# Patient Record
Sex: MALE | Race: WHITE | NOT HISPANIC OR LATINO | URBAN - METROPOLITAN AREA
[De-identification: names, ages, dates, MRNs, and addresses within clinical notes are randomized per-mention and may not be internally consistent; named-entity substitution may affect disease eponyms.]

---

## 2019-08-13 ENCOUNTER — OFFICE (OUTPATIENT)
Dept: URBAN - METROPOLITAN AREA CLINIC 101 | Facility: CLINIC | Age: 54
End: 2019-08-13

## 2019-08-13 VITALS
WEIGHT: 266 LBS | HEIGHT: 69 IN | DIASTOLIC BLOOD PRESSURE: 91 MMHG | HEART RATE: 83 BPM | SYSTOLIC BLOOD PRESSURE: 141 MMHG | TEMPERATURE: 97.9 F

## 2019-08-13 DIAGNOSIS — Z12.11 ENCOUNTER FOR SCREENING FOR MALIGNANT NEOPLASM OF COLON: ICD-10-CM

## 2019-08-13 DIAGNOSIS — R74.8 ABNORMAL LEVELS OF OTHER SERUM ENZYMES: ICD-10-CM

## 2019-08-13 LAB
ALPHA-1-ANTITRYPSIN, SERUM: 174 MG/DL (ref 90–200)
ANTI-SMOOTH MUSCLE/MITOCHOND.: ACTIN (SMOOTH MUSCLE) ANTIBODY: 7 UNITS (ref 0–19)
ANTI-SMOOTH MUSCLE/MITOCHOND.: MITOCHONDRIAL (M2) ANTIBODY: <20 UNITS
ANTINUCLEAR ANTIBODIES, IFA: NEGATIVE
CELIAC DISEASE COMPREHENSIVE: DEAMIDATED GLIADIN ABS, IGA: 5 UNITS (ref 0–19)
CELIAC DISEASE COMPREHENSIVE: DEAMIDATED GLIADIN ABS, IGG: 2 UNITS (ref 0–19)
CELIAC DISEASE COMPREHENSIVE: ENDOMYSIAL ANTIBODY IGA: NEGATIVE
CELIAC DISEASE COMPREHENSIVE: IMMUNOGLOBULIN A, QN, SERUM: 314 MG/DL (ref 90–386)
CELIAC DISEASE COMPREHENSIVE: T-TRANSGLUTAMINASE (TTG) IGA: <2 U/ML
CELIAC DISEASE COMPREHENSIVE: T-TRANSGLUTAMINASE (TTG) IGG: <2 U/ML
CERULOPLASMIN: 36.2 MG/DL — HIGH (ref 16–31)
FERRITIN, SERUM: 823 NG/ML — HIGH (ref 30–400)
HBSAG SCREEN: NEGATIVE
HCV ANTIBODY: HEP C VIRUS AB: <0.1 S/CO RATIO
HEP A AB, IGM: NEGATIVE
HEP A AB, TOTAL: NEGATIVE
HEP B CORE AB, IGM: NEGATIVE
HEP B CORE AB, TOT: NEGATIVE
HEP BE AB: NEGATIVE
HEP BE AG: NEGATIVE
HEPATITIS B SURF AB QUANT: <3.1 MIU/ML — LOW
IRON AND TIBC: IRON BIND.CAP.(TIBC): 378 UG/DL (ref 250–450)
IRON AND TIBC: IRON SATURATION: 25 % (ref 15–55)
IRON AND TIBC: IRON: 96 UG/DL (ref 38–169)
IRON AND TIBC: UIBC: 282 UG/DL (ref 111–343)
LIVER-KIDNEY MICROSOMAL AB: 1.6 UNITS (ref 0–20)
PT AND PTT: APTT: 27 SEC (ref 24–33)
PT AND PTT: INR: 1 (ref 0.8–1.2)
PT AND PTT: PROTHROMBIN TIME: 10.6 SEC (ref 9.1–12)

## 2019-08-13 PROCEDURE — 99244 OFF/OP CNSLTJ NEW/EST MOD 40: CPT

## 2019-08-13 NOTE — SERVICEHPINOTES
SHE ORTEZ   is a   53   year old male who is being seen in consultation at the request of   BLAINE  JEANE   for elevated liver enzymes. His ALT was 268 and his AST was 100. Bili and alk phos were normal. AST and ALT were elevated as far back as 2011 (according to labs sent over). He denies any personal or family history of hepatitis, autoimmune disease, liver cancer. He denies any h/o IVDU, accidental needle sticks, or blood transfusions. No new medications. He was taking some OTC meds for URI issues. He denies any skin rashes, jaundice, joint aches. No abdominal pain, nausea, vomiting, diarrhea, constipation, melena, rectal bleeding, loss of appetite, weight loss. He admits to eating poorly prior to labwork. He is eating more salads and doing more cardiovascular exercise. He has stopped drinking soda and alcohol. He has never had a colonoscopy

## 2019-09-17 ENCOUNTER — OFFICE (OUTPATIENT)
Dept: URBAN - METROPOLITAN AREA CLINIC 100 | Facility: CLINIC | Age: 54
End: 2019-09-17
Payer: COMMERCIAL

## 2019-09-17 VITALS
OXYGEN SATURATION: 97 % | DIASTOLIC BLOOD PRESSURE: 43 MMHG | OXYGEN SATURATION: 98 % | DIASTOLIC BLOOD PRESSURE: 77 MMHG | WEIGHT: 266 LBS | HEART RATE: 84 BPM | DIASTOLIC BLOOD PRESSURE: 68 MMHG | DIASTOLIC BLOOD PRESSURE: 59 MMHG | DIASTOLIC BLOOD PRESSURE: 54 MMHG | SYSTOLIC BLOOD PRESSURE: 89 MMHG | OXYGEN SATURATION: 99 % | SYSTOLIC BLOOD PRESSURE: 104 MMHG | SYSTOLIC BLOOD PRESSURE: 137 MMHG | SYSTOLIC BLOOD PRESSURE: 93 MMHG | DIASTOLIC BLOOD PRESSURE: 83 MMHG | OXYGEN SATURATION: 92 % | HEART RATE: 78 BPM | DIASTOLIC BLOOD PRESSURE: 67 MMHG | TEMPERATURE: 972 F | RESPIRATION RATE: 16 BRPM | TEMPERATURE: 97.7 F | SYSTOLIC BLOOD PRESSURE: 148 MMHG | HEART RATE: 93 BPM | SYSTOLIC BLOOD PRESSURE: 99 MMHG | SYSTOLIC BLOOD PRESSURE: 103 MMHG | HEIGHT: 69 IN | HEART RATE: 83 BPM | RESPIRATION RATE: 14 BRPM | HEART RATE: 80 BPM

## 2019-09-17 DIAGNOSIS — Z12.11 ENCOUNTER FOR SCREENING FOR MALIGNANT NEOPLASM OF COLON: ICD-10-CM

## 2019-09-17 DIAGNOSIS — K62.1 RECTAL POLYP: ICD-10-CM

## 2019-09-17 DIAGNOSIS — K63.5 POLYP OF COLON: ICD-10-CM

## 2019-09-17 PROBLEM — D12.4 BENIGN NEOPLASM OF DESCENDING COLON: Status: ACTIVE | Noted: 2019-09-17

## 2019-09-17 LAB
GI LOWER POLYPECTOMY EXCISION - SPECM 1 JAR(S): 2: (no result)
PDF REPORT: (no result)

## 2019-09-17 PROCEDURE — 45380 COLONOSCOPY AND BIOPSY: CPT | Mod: PT
